# Patient Record
(demographics unavailable — no encounter records)

---

## 2025-01-14 NOTE — HISTORY OF PRESENT ILLNESS
[Premenarchal] : premenarchal [FreeTextEntry2] : 6 year 1 month old female seen for initial consultation of early development about 6 months parents started smelling body odor, pubic hair starting around the same the same, no underarm hair no breast development, mom unsure about vaginal discharge, maybe some white discharge  no androgenic products

## 2025-01-14 NOTE — PHYSICAL EXAM
[Healthy Appearing] : healthy appearing [Well Nourished] : well nourished [Interactive] : interactive [Normal Appearance] : normal appearance [Well formed] : well formed [Normally Set] : normally set [WNL for age] : within normal limits of age [6 yr Molar Erupted] : 6 year molars have erupted [Normal S1 and S2] : normal S1 and S2 [Clear to Ausculation Bilaterally] : clear to auscultation bilaterally [Abdomen Soft] : soft [Abdomen Tenderness] : non-tender [] : no hepatosplenomegaly [3] : was Kenrick stage 3 [Scant] : scant [Kenrick Stage ___] : the Kenrick stage for breast development was [unfilled] [Normal] : normal  [Murmur] : no murmurs [FreeTextEntry2] : + odor

## 2025-01-14 NOTE — FAMILY HISTORY
[___ inches] : [unfilled] inches [FreeTextEntry5] : 10-11 years [FreeTextEntry2] : 3 year old sister, development delay

## 2025-01-14 NOTE — PAST MEDICAL HISTORY
[At ___ Weeks Gestation] : at [unfilled] weeks gestation [Normal Vaginal Route] : by normal vaginal route [None] : there were no delivery complications [Age Appropriate] : age appropriate developmental milestones met [FreeTextEntry1] : 5 pounds 11 ounces [FreeTextEntry4] : preeclampsia

## 2025-01-22 NOTE — DEVELOPMENTAL MILESTONES
[Counts 10 objects] : counts 10 objects [Can do simple addition and] : can do simple addition and subtraction with objects [Hops on one foot 3 to 4 times] : hops on one foot 3 to 4 times [Writes first and last name in] : writes first and last name in uppercase or lowercase letters [None] : none [Is dry day and night] : is dry day and night [Starts/continues conversation with peers] : starts/continues conversation with peers [Plays and interacts with at least one] : plays and interacts with at least one "best friend" [Prints 3 or more simple words] : prints 3 or more simple words without copying [Ties shoes] : does not tie shoes [Catches small ball with] : does not catch small ball with 2 hands

## 2025-01-22 NOTE — PHYSICAL EXAM
[Alert] : alert [No Acute Distress] : no acute distress [Normocephalic] : normocephalic [Conjunctivae with no discharge] : conjunctivae with no discharge [PERRL] : PERRL [EOMI Bilateral] : EOMI bilateral [Auricles Well Formed] : auricles well formed [Clear Tympanic membranes with present light reflex and bony landmarks] : clear tympanic membranes with present light reflex and bony landmarks [No Discharge] : no discharge [Nares Patent] : nares patent [Pink Nasal Mucosa] : pink nasal mucosa [Palate Intact] : palate intact [Nonerythematous Oropharynx] : nonerythematous oropharynx [Supple, full passive range of motion] : supple, full passive range of motion [No Palpable Masses] : no palpable masses [Symmetric Chest Rise] : symmetric chest rise [Clear to Auscultation Bilaterally] : clear to auscultation bilaterally [Regular Rate and Rhythm] : regular rate and rhythm [Normal S1, S2 present] : normal S1, S2 present [No Murmurs] : no murmurs [+2 Femoral Pulses] : +2 femoral pulses [Soft] : soft [NonTender] : non tender [Non Distended] : non distended [Normoactive Bowel Sounds] : normoactive bowel sounds [No Hepatomegaly] : no hepatomegaly [No Splenomegaly] : no splenomegaly [Patent] : patent [No fissures] : no fissures [No Abnormal Lymph Nodes Palpated] : no abnormal lymph nodes palpated [No Gait Asymmetry] : no gait asymmetry [No pain or deformities with palpation of bone, muscles, joints] : no pain or deformities with palpation of bone, muscles, joints [Normal Muscle Tone] : normal muscle tone [Straight] : straight [+2 Patella DTR] : +2 patella DTR [Cranial Nerves Grossly Intact] : cranial nerves grossly intact [No Rash or Lesions] : no rash or lesions [Kenrick: ____] : Kenrick [unfilled]

## 2025-01-22 NOTE — HISTORY OF PRESENT ILLNESS
[Meat] : meat [Normal] : Normal [Yes] : Patient goes to dentist yearly [Appropiate parent-child-sibling interaction] : Appropriate parent-child-sibling interaction [Grade ___] : Grade [unfilled] [No] : No cigarette smoke exposure [Water heater temperature set at <120 degrees F] : Water heater temperature set at <120 degrees F [Car seat in back seat] : Car seat in back seat [Carbon Monoxide Detectors] : Carbon monoxide detectors [Smoke Detectors] : Smoke detectors [Supervised outdoor play] : Supervised outdoor play [Up to date] : Up to date [FreeTextEntry7] : saw Peds Endo due to concerns related to body odor - see consult note for full details  [de-identified] : limited veggies

## 2025-01-22 NOTE — HISTORY OF PRESENT ILLNESS
[Meat] : meat [Normal] : Normal [Yes] : Patient goes to dentist yearly [Appropiate parent-child-sibling interaction] : Appropriate parent-child-sibling interaction [Grade ___] : Grade [unfilled] [No] : No cigarette smoke exposure [Water heater temperature set at <120 degrees F] : Water heater temperature set at <120 degrees F [Car seat in back seat] : Car seat in back seat [Carbon Monoxide Detectors] : Carbon monoxide detectors [Smoke Detectors] : Smoke detectors [Supervised outdoor play] : Supervised outdoor play [Up to date] : Up to date [FreeTextEntry7] : saw Peds Endo due to concerns related to body odor - see consult note for full details  [de-identified] : limited veggies

## 2025-01-22 NOTE — DISCUSSION/SUMMARY
[Normal Growth] : growth [Normal Development] : development [None] : No known medical problems [No Elimination Concerns] : elimination [No Feeding Concerns] : feeding [No Skin Concerns] : skin [Normal Sleep Pattern] : sleep [No Medications] : ~He/She~ is not on any medications [Patient] : patient [School Readiness] : school readiness [Mental Health] : mental health [Nutrition and Physical Activity] : nutrition and physical activity [Oral Health] : oral health [Safety] : safety [Full Activity without restrictions including Physical Education & Athletics] : Full Activity without restrictions including Physical Education & Athletics [I have examined the above-named student and completed the preparticipation physical evaluation. The athlete does not present apparent clinical contraindications to practice and participate in sport(s) as outlined above. A copy of the physical exam is on r] : I have examined the above-named student and completed the preparticipation physical evaluation. The athlete does not present apparent clinical contraindications to practice and participate in sport(s) as outlined above. A copy of the physical exam is on record in my office and can be made available to the school at the request of the parents. If conditions arise after the athlete has been cleared for participation, the physician may rescind the clearance until the problem is resolved and the potential consequences are completely explained to the athlete (and parents/guardians). [FreeTextEntry1] : Continue balanced diet with all food groups. Brush teeth twice a day with toothbrush. Recommend visit to dentist. As per car seat 's guidelines, use foward-facing booster seat until child reaches highest weight/height for seat. Child needs to ride in a belt-positioning booster seat until  4 feet 9 inches has been reached and are between 8 and 12 years of age. Put child to sleep in own bed. Help child to maintain consistent daily routines and sleep schedule. School discussed. Ensure home is safe. Teach child about personal safety. Use consistent, positive discipline. Read aloud to child. Limit screen time to no more than 2 hours per day. Return 1 year for routine well child check. Encouraged to have labs/imaging completed as per Endo

## 2025-01-22 NOTE — HISTORY OF PRESENT ILLNESS
[Meat] : meat [Normal] : Normal [Yes] : Patient goes to dentist yearly [Appropiate parent-child-sibling interaction] : Appropriate parent-child-sibling interaction [Grade ___] : Grade [unfilled] [No] : No cigarette smoke exposure [Water heater temperature set at <120 degrees F] : Water heater temperature set at <120 degrees F [Car seat in back seat] : Car seat in back seat [Carbon Monoxide Detectors] : Carbon monoxide detectors [Smoke Detectors] : Smoke detectors [Supervised outdoor play] : Supervised outdoor play [Up to date] : Up to date [FreeTextEntry7] : saw Peds Endo due to concerns related to body odor - see consult note for full details  [de-identified] : limited veggies